# Patient Record
Sex: FEMALE | Race: WHITE
[De-identification: names, ages, dates, MRNs, and addresses within clinical notes are randomized per-mention and may not be internally consistent; named-entity substitution may affect disease eponyms.]

---

## 2017-01-07 DIAGNOSIS — F41.1 GAD (GENERALIZED ANXIETY DISORDER): Primary | ICD-10-CM

## 2017-01-09 NOTE — TELEPHONE ENCOUNTER
sertraline (ZOLOFT) 100 MG tablet     Last Written Prescription Date: 04/04/16  Last Fill Quantity: 135, # refills: 1  Last Office Visit with [facility] primary care provider:  08/21/15.        Last PHQ-9 score on record=   PHQ-9 SCORE 4/5/2016   Total Score MyChart 2 (Minimal depression)   Total Score 2

## 2017-01-10 RX ORDER — SERTRALINE HYDROCHLORIDE 100 MG/1
TABLET, FILM COATED ORAL
Qty: 135 TABLET | Refills: 0 | Status: SHIPPED | OUTPATIENT
Start: 2017-01-10

## 2017-01-10 NOTE — TELEPHONE ENCOUNTER
Sertraline 100 mg  Last Written Prescription Date: 4/4/16  Last Fill Quantity: 45 # refills: 0  Last Office Visit with [facility] primary care provider:  8/21/15       Last PHQ-9 score on record=   PHQ-9 SCORE 4/5/2016   Total Score MyChart 2 (Minimal depression)   Total Score 2         Viri refill for one month provided to patient.    MyChart message sent to patient notifying her of the need to schedule follow up appointment with [facility] for further refills.

## 2017-12-31 ENCOUNTER — HEALTH MAINTENANCE LETTER (OUTPATIENT)
Age: 52
End: 2017-12-31

## 2019-02-15 ENCOUNTER — HEALTH MAINTENANCE LETTER (OUTPATIENT)
Age: 54
End: 2019-02-15

## 2020-03-10 ENCOUNTER — HEALTH MAINTENANCE LETTER (OUTPATIENT)
Age: 55
End: 2020-03-10

## 2020-12-27 ENCOUNTER — HEALTH MAINTENANCE LETTER (OUTPATIENT)
Age: 55
End: 2020-12-27

## 2021-04-24 ENCOUNTER — HEALTH MAINTENANCE LETTER (OUTPATIENT)
Age: 56
End: 2021-04-24

## 2021-10-04 ENCOUNTER — HEALTH MAINTENANCE LETTER (OUTPATIENT)
Age: 56
End: 2021-10-04

## 2022-05-15 ENCOUNTER — HEALTH MAINTENANCE LETTER (OUTPATIENT)
Age: 57
End: 2022-05-15

## 2022-09-11 ENCOUNTER — HEALTH MAINTENANCE LETTER (OUTPATIENT)
Age: 57
End: 2022-09-11

## 2023-06-03 ENCOUNTER — HEALTH MAINTENANCE LETTER (OUTPATIENT)
Age: 58
End: 2023-06-03